# Patient Record
Sex: FEMALE | Race: WHITE
[De-identification: names, ages, dates, MRNs, and addresses within clinical notes are randomized per-mention and may not be internally consistent; named-entity substitution may affect disease eponyms.]

---

## 2017-06-21 LAB
ERYTHROCYTE [DISTWIDTH] IN BLOOD BY AUTOMATED COUNT: 15.7 % (ref 11.5–14.5)
HCT VFR BLD CALC: 33.2 % (ref 36–48)
HGB BLD-MCNC: 10.7 G/DL (ref 12–16)
MCH RBC QN AUTO: 24.8 PG (ref 26–34)
MCHC RBC AUTO-ENTMCNC: 32.2 G/DL (ref 31–37)
MCV RBC: 77 FL (ref 80–100)
PLATELET # BLD: 191 10X3/UL (ref 130–400)
RBC # BLD AUTO: 4.31 10X6/UL (ref 4–5.4)
WBC # BLD AUTO: 6.3 10X3/UL (ref 4.8–10.8)

## 2017-06-22 ENCOUNTER — HOSPITAL ENCOUNTER (OUTPATIENT)
Dept: HOSPITAL 84 - D.OPS | Age: 27
Discharge: HOME | End: 2017-06-22
Attending: PODIATRIST
Payer: MEDICARE

## 2017-06-22 VITALS — BODY MASS INDEX: 27.42 KG/M2 | HEIGHT: 62 IN | WEIGHT: 149 LBS | BODY MASS INDEX: 27.42 KG/M2

## 2017-06-22 VITALS — SYSTOLIC BLOOD PRESSURE: 93 MMHG | DIASTOLIC BLOOD PRESSURE: 59 MMHG

## 2017-06-22 DIAGNOSIS — Z01.812: ICD-10-CM

## 2017-06-22 DIAGNOSIS — M20.12: Primary | ICD-10-CM

## 2017-06-22 LAB — HCG UR QL: NEGATIVE

## 2017-06-22 NOTE — OP
PATIENT NAME:  AMADEO MOTA                            MEDICAL RECORD: [id number]
:89                                             LOCATION:D.OPS          
                                                         ADMISSION DATE:        
SURGEON:  SHAILESH GUZMÁN             
 
 
DATE OF OPERATION:  2017
 
SURGEON:  Shailesh Guzmán DPM.
 
PREOPERATIVE DIAGNOSIS:  Hallux abductovalgus, left foot.
 
POSTOPERATIVE DIAGNOSIS:  Hallux abductovalgus, left foot.
 
PROCEDURE:  Ochoa bunionectomy, left foot.
 
ANESTHESIA:  Local with monitored anesthesia care.
 
HEMOSTASIS:  Pneumatic ankle tourniquet inflated to 250 mmHg.
 
ESTIMATED BLOOD LOSS:  Minimal.
 
MATERIALS:  One 3.0 mm cannulated [employer] screw.
 
INJECTABLES:  20 cc of 0.5% bupivacaine plain.
 
The patient has a longstanding history of pain associated with a bunion
deformity of the left foot.  She has tried wider shoes to no avail.  She is here
today for surgical correction of the left foot deformity.  I have discussed with
her and her mother risks and benefits of the procedure.  Complications were
reviewed.  Her questions were answered.  She was appropriately consented for
Ochoa bunionectomy of the left foot.
 
The patient was brought in the operating room and placed on the operating table
in supine position.  A timeout was called with Dr. Guzmán, who identified the
patient, the surgical site, and the surgery to be performed.  Once appropriate
anesthesia was obtained, the foot was prepped and draped in the usual aseptic
manner.
 
The pneumatic ankle tourniquet was inflated to 250 mmHg around the well-padded
left ankle.
 
Attention was directed to the dorsal aspect of the first metatarsophalangeal
joint of the left foot where a 6-cm linear incision was made just medial to the
extensor hallucis longus tendon.  This incision was carried deep through soft
tissue with care being taken to retract all vital neurovascular structures.  All
bleeders were cauterized along the way.  Through this incision, the first
intermetatarsal space was entered utilizing both blunt and sharp dissection. 
The conjoined tendon of the adductor hallucis muscle belly was then identified
at the base of the proximal phalanx and was sharply transected.  The fibular
sesamoidal ligament was also identified at the lateral aspect of the first
metatarsal head and this was sharply transected.  Attention was then redirected
to the dorsal aspect of the first metatarsophalangeal joint where the periosteum
was reflected from the base of the proximal phalanx and the head of the first
metatarsal.  The hypertrophied medial eminence of the first metatarsal head was
then exposed.  Utilizing a sagittal saw, the hypertrophied medial eminence was
resected.  Next, utilizing a sagittal saw, a V-shaped osteotomy was created in
the head of the first metatarsal.  The apex of the V was oriented distally. 
 
 
 
OPERATIVE REPORT                               [id number]    AMADEO MOTA          
 
 
This was a through and through osteotomy.  The capital fragment was translocated
laterally and impacted upon the first metatarsal shaft.  Next, utilizing
manufacture's recommended technique, one 3.0 cannulated [employer] screw was
placed across the osteotomy.  All remaining bone from the medial aspect of the
first metatarsal shaft was then resected with a sagittal saw.  Any remaining
sharp bony prominences were resected with sagittal saw and rongeur.  The
surgical site was then irrigated with copious amounts of normal sterile saline.
 
The periosteum was then reapproximated and coapted utilizing 3-0 Vicryl.  The
subQ was then reapproximated and coapted utilizing 3-0 Vicryl.  The skin was
then reapproximated and coapted utilizing 4-0 nylon.  A dressing consisting of
Xeroform, 4 x 4, Kerlix, and an Ace bandage was applied to the left foot.  The
pneumatic ankle tourniquet was deflated and capillary refill time was noted to
be immediate to all digits of the left foot.
 
The patient tolerated the procedure and anesthesia well.  She will be discharged
home with instructions to ice and elevate the left foot.  She has a boot to help
offload the lower extremity.  She was provided with prescriptions for Norco
7.5/325 and Phenergan 25 mg.  She has my cell phone number for any after hour
difficulties.  We will follow up with her next week and there were no
complications with this procedure.
 
TRANSINT:[id number] Voice Confirmation ID: [id number] DOCUMENT ID: [id number]
                                           
                                           SHAILESH GUZMÁN             
 
 
 
 
 
 
 
 
 
 
 
 
 
 
 
 
 
 
 
 
 
 
CC:                                                             [id number]
DICTATION DATE: 17     :     17 2354      [facility] 
                                                                      [id number] [place] [address]

## 2017-06-22 NOTE — NUR
1545 BACK FROM LEFT BUNIONECTOMY. SPEAKS WITH A STUTTER.RESP EVEN AND
NONLABORED,SLEEPY. LEFT FOOT DRESSING C/D/I ICED AND ELEVATED TOES
TINGLIY AND ELEVATED ON PILLOW.CAPILLARY REFILL IMMEDIATE TO NAIL
BEDS.

## 2017-06-22 NOTE — NUR
1650--DISCHARGE INSTRUCTIONS GIVEN, PT VERBALIZES UNDERSTANDING. PT
OFF UNIT VIA ROGER. CANDACE INGRAM

## 2020-06-16 ENCOUNTER — HOSPITAL ENCOUNTER (OUTPATIENT)
Dept: HOSPITAL 84 - D.LABREF | Age: 30
Discharge: HOME | End: 2020-06-16
Attending: INTERNAL MEDICINE
Payer: MEDICARE

## 2020-06-16 VITALS — BODY MASS INDEX: 27.3 KG/M2

## 2020-06-16 DIAGNOSIS — Z11.59: Primary | ICD-10-CM

## 2020-06-18 ENCOUNTER — HOSPITAL ENCOUNTER (OUTPATIENT)
Dept: HOSPITAL 84 - D.OPS | Age: 30
Discharge: HOME | End: 2020-06-18
Attending: PODIATRIST
Payer: MEDICARE

## 2020-06-18 VITALS — SYSTOLIC BLOOD PRESSURE: 126 MMHG | DIASTOLIC BLOOD PRESSURE: 82 MMHG

## 2020-06-18 VITALS — HEIGHT: 61 IN | BODY MASS INDEX: 27.38 KG/M2 | WEIGHT: 145 LBS

## 2020-06-18 DIAGNOSIS — M20.21: ICD-10-CM

## 2020-06-18 DIAGNOSIS — M79.671: Primary | ICD-10-CM

## 2020-06-18 DIAGNOSIS — Z53.9: ICD-10-CM

## 2020-06-18 LAB
ERYTHROCYTE [DISTWIDTH] IN BLOOD BY AUTOMATED COUNT: 14.7 % (ref 11.5–14.5)
HCG SERPL-ACNC: NEGATIVE M[IU]/ML
HCT VFR BLD CALC: 35.7 % (ref 36–48)
HGB BLD-MCNC: 11.8 G/DL (ref 12–16)
MCH RBC QN AUTO: 26.6 PG (ref 26–34)
MCHC RBC AUTO-ENTMCNC: 33.1 G/DL (ref 31–37)
MCV RBC: 80.6 FL (ref 80–100)
PLATELET # BLD: 219 10X3/UL (ref 130–400)
RBC # BLD AUTO: 4.43 10X6/UL (ref 4–5.4)
WBC # BLD AUTO: 5.6 10X3/UL (ref 4.8–10.8)

## 2020-07-03 ENCOUNTER — HOSPITAL ENCOUNTER (OUTPATIENT)
Dept: HOSPITAL 84 - D.RAD | Age: 30
Discharge: HOME | End: 2020-07-03
Attending: FAMILY MEDICINE
Payer: MEDICARE

## 2020-07-03 VITALS — BODY MASS INDEX: 27.4 KG/M2

## 2020-07-03 DIAGNOSIS — J84.9: Primary | ICD-10-CM
